# Patient Record
Sex: FEMALE
[De-identification: names, ages, dates, MRNs, and addresses within clinical notes are randomized per-mention and may not be internally consistent; named-entity substitution may affect disease eponyms.]

---

## 2018-05-09 ENCOUNTER — HOSPITAL ENCOUNTER (OUTPATIENT)
Dept: HOSPITAL 88 - MAMMO | Age: 48
End: 2018-05-09
Attending: INTERNAL MEDICINE
Payer: COMMERCIAL

## 2018-05-09 DIAGNOSIS — Z12.31: Primary | ICD-10-CM

## 2018-05-09 PROCEDURE — 77067 SCR MAMMO BI INCL CAD: CPT

## 2018-06-05 ENCOUNTER — HOSPITAL ENCOUNTER (OUTPATIENT)
Dept: HOSPITAL 88 - MAMMO | Age: 48
End: 2018-06-05
Attending: INTERNAL MEDICINE
Payer: COMMERCIAL

## 2018-06-05 DIAGNOSIS — R92.8: Primary | ICD-10-CM

## 2018-06-05 NOTE — DIAGNOSTIC IMAGING REPORT
#[id number] - MGDXLT

#UNILATERAL LEFT DIGITAL DIAGNOSTIC MAMMOGRAM WITH SPOT COMPRESSION AND MAGNIFICATION: 6/5/2018

Comparison is made to exams dated:  5/9/2018 mammogram - [facility] and 

5/30/2015 mammogram - [facility].  Current study contains 3 films.  

The tissue of the left breast is heterogeneously dense. This may lower the sensitivity of 

mammography.  

The area of new calcification in the left breast may be vascular in origin.  A one time interim 6 

month followup mammogram is recommended to demonstrate stability.  Scattered additional 

calcifications appear bening.   



IMPRESSION: PROBABLY BENIGN

A follow-up mammogram in 6 months is recommended to demonstrate stability. 

The patient has been or will be notified of the results.  





Miky Arora Jr., D.O.          

cw/:6/5/2018 15:03:13  



Imaging Technologist: Malaika LOZADA)(LAURIE), [facility]

letter sent: Followup Recommended  

Mammogram BI-RADS: 3 Probably benign

## 2019-03-05 ENCOUNTER — HOSPITAL ENCOUNTER (OUTPATIENT)
Dept: HOSPITAL 88 - MAMMO | Age: 49
End: 2019-03-05
Attending: INTERNAL MEDICINE
Payer: COMMERCIAL

## 2019-03-05 DIAGNOSIS — Z12.31: Primary | ICD-10-CM

## 2019-03-06 NOTE — DIAGNOSTIC IMAGING REPORT
#[id number] - MGDXLT

#UNILATERAL LEFT DIGITAL DIAGNOSTIC MAMMOGRAM WITH CAD WITH MAGNIFICATION SHORT-TERM FOLLOW-UP: 

3/5/2019

Comparison is made to exams dated:  6/5/2018 mammogram, 5/9/2018 mammogram - [facility] and 5/30/2015 mammogram - [facility].  Current study 

contains 6 films.  

The tissue of the left breast is heterogeneously dense. This may lower the sensitivity of 

mammography.  

Current study was also evaluated with a Computer Aided Detection (CAD) system.

There is little interval change in several areas of grouped calcifications.  Most of these do not 

have a suspicious shape.  

No significant masses, calcifications, or other findings are seen in the breast.  



IMPRESSION: PROBABLY BENIGN

A follow-up mammogram in 6 months is recommended to demonstrate stability. 

The patient has been notified of the results and the need for followup.  





Miky Arora Jr., D.O.          

cw/:3/5/2019 17:42:01  



Imaging Technologist: Malaika MARTINEZ(JOHNNY)(LAURIE), [facility]

letter sent: Followup Recommended  

Mammogram BI-RADS: 3 Probably benign

## 2019-03-28 ENCOUNTER — HOSPITAL ENCOUNTER (OUTPATIENT)
Dept: HOSPITAL 88 - CT | Age: 49
End: 2019-03-28
Attending: INTERNAL MEDICINE
Payer: COMMERCIAL

## 2019-03-28 DIAGNOSIS — R51: Primary | ICD-10-CM

## 2019-03-28 PROCEDURE — 70450 CT HEAD/BRAIN W/O DYE: CPT

## 2019-03-28 NOTE — DIAGNOSTIC IMAGING REPORT
Exam: Head CT without contrast

History:  Trauma sharp headache with pain and pressure.

Comparison studies:  None



Technique:

Axial images were obtained from the skull base to the vertex.

Coronal and sagittal images reconstructed from the axial data.  Dose

modulation, iterative reconstruction, and/or weight based adjustment of the

mA/kV was utilized to reduce the radiation dose to as low as reasonably

achievable.  



Radiation dose: 

Total DLP: 921 mGy*cm. 

Estimated effective dose: DLP x 0.015 

Intravenous contrast: None



Findings:



Scalp: No abnormalities.

Bones: No fractures, blastic or lytic lesions.



Brain sulci: Appropriate for age.

Ventricles: Normal in size and configuration. No hydrocephalus.

Extra-axial spaces: No masses, no fluid collection. 



Parenchyma: 

No abnormal densities. 

No masses, acute hemorrhage, acute or chronic vascular insults.



Sellar/suprasellar region: No abnormalities.

Craniocervical junction: Patent foramen magnum. No Chiari one malformation.



Middle ear and mastoid cavities: Clear.

Included paranasal sinuses: The sphenoid sinuses, partially imaged ethmoid air

cells, left frontal sinus and hypoplastic right frontal sinus are clear.





IMPRESSION:

 

No intracranial abnormalities.



Signed by: Dr. Brenton Wood M.D. on 3/28/2019 4:16 PM

## 2019-04-01 ENCOUNTER — HOSPITAL ENCOUNTER (EMERGENCY)
Dept: HOSPITAL 88 - ER | Age: 49
Discharge: HOME | End: 2019-04-01
Payer: COMMERCIAL

## 2019-04-01 VITALS — HEIGHT: 59 IN | WEIGHT: 192 LBS | BODY MASS INDEX: 38.71 KG/M2

## 2019-04-01 DIAGNOSIS — G44.89: Primary | ICD-10-CM

## 2019-04-01 DIAGNOSIS — K21.9: ICD-10-CM

## 2019-04-01 DIAGNOSIS — I10: ICD-10-CM

## 2019-04-01 DIAGNOSIS — G43.909: ICD-10-CM

## 2019-04-01 DIAGNOSIS — E11.9: ICD-10-CM

## 2019-04-01 LAB
BACTERIA URNS QL MICRO: (no result) /HPF
BILIRUB UR QL: NEGATIVE
CLARITY UR: CLEAR
COLOR UR: YELLOW
DEPRECATED RBC URNS MANUAL-ACNC: (no result) /HPF (ref 0–5)
EPI CELLS URNS QL MICRO: (no result) /LPF
KETONES UR QL STRIP.AUTO: NEGATIVE
LEUKOCYTE ESTERASE UR QL STRIP.AUTO: NEGATIVE
NITRITE UR QL STRIP.AUTO: NEGATIVE
PROT UR QL STRIP.AUTO: NEGATIVE
SP GR UR STRIP: 1 (ref 1.01–1.02)
UROBILINOGEN UR STRIP-MCNC: 0.2 MG/DL (ref 0.2–1)
WBC #/AREA URNS HPF: (no result) /HPF (ref 0–5)

## 2019-04-01 PROCEDURE — 81001 URINALYSIS AUTO W/SCOPE: CPT

## 2019-04-01 PROCEDURE — 99283 EMERGENCY DEPT VISIT LOW MDM: CPT

## 2019-04-01 NOTE — XMS REPORT
Patient Summary Document

                             Created on: 2019



RIMMA MOHR

External Reference #: [id number]

: 1970

Sex: Female



Demographics







                          Address                   [id number] [place] [address] 

[place]  [address]

 

                          Home Phone                [phone]

 

                          Preferred Language        Unknown

 

                          Marital Status            Unknown

 

                          [employer] Affiliation     Unknown

 

                          Race                      Unknown

 

                                        Additional Race(s)  

 

                          Ethnic Group              Unknown





Author







                          [facility]                    [facility]

 

                          [facility]

 

                          Address                   Unknown

 

                          Phone                     Unavailable







Care Team Providers







                    Care Team Member Name    Role                Phone

 

                    LAURIE VAUGHN      Unavailable         Unavailable







Problems

This patient has no known problems.



Allergies, Adverse Reactions, Alerts

This patient has no known allergies or adverse reactions.



Medications

This patient has no known medications.



Results







           Test Description    Test Time    Test Comments    Text Results    Atomic Results    Result

 Comments

 

                CT BRAIN WO     2019 16:12:00                                                               

                                           [place] [address]    
 Patient Name: RIMMA MOHR                                   MR #: [id number] 
                   : 1970                                   Age/Sex: 
48/F  Acct #: [id number]                              Req #: [id number]  Adm 
Physician:                                                      Ordered by: 
JESUS VAUGHN MD                            Report #: [id number]        
Location: CT                                      Room/Bed:                     
___________________________________________________________________________________________________
   Procedure: [id number] CT/CT BRAIN WO  Exam Date: 19                     
      Exam Time: 1530                                              REPORT 
STATUS: Signed    Exam: Head CT without contrast   History:  Trauma sharp 
headache with pain and pressure.   Comparison studies:  None      Technique:   
Axial images were obtained from the skull base to the vertex.   Coronal and 
sagittal images reconstructed from the axial data.  Dose   modulation, iterative
reconstruction, and/or weight based adjustment of the   mA/kV was utilized to 
reduce the radiation dose to as low as reasonably   achievable.        Radiation
dose:    Total DLP: 921 mGy*cm.    Estimated effective dose: DLP x 0.015    
Intravenous contrast: None      Findings:      Scalp: No abnormalities.   Bones:
No fractures, blastic or lytic lesions.      Brain sulci: Appropriate for age.  
Ventricles: Normal in size and configuration. No hydrocephalus.   Extra-axial 
spaces: No masses, no fluid collection.       Parenchyma:    No abnormal 
densities.    No masses, acute hemorrhage, acute or chronic vascular insults.   
  Sellar/suprasellar region: No abnormalities.   Craniocervical junction: Patent
foramen magnum. No Chiari one malformation.      Middle ear and mastoid ca
vities: Clear.   Included paranasal sinuses: The sphenoid sinuses, partially 
imaged ethmoid air   cells, left frontal sinus and hypoplastic right frontal 
sinus are clear.         IMPRESSION:       No intracranial abnormalities.      
Signed by: Dr. Gena Wood M.D. on 3/28/2019 4:16 PM        Dictated By: 
GENA WOOD MD  Electronically Signed By: GENA WOOD MD on 19  Transcribed By: CINDY on 19       COPY TO:   JESUS VAUGHN MD
                                         

 

                MAMMOGRAPHY DIGITAL DX UNI LT    2019 16:11:00                                             

                                                             [place] [address]      Patient Name: RIMMA MOHR                          
        MR #: [id number]                     : 1970                    
              Age/Sex: 48/F  Acct #: [id number]                              
Req #: [id number]  Adm Physician:                                               
      Ordered by: JESUS VAUGHN MD                            Report #: [id number]
[id number]        Location: [facility]                                   Room/Bed:         
           
___________________________________________________________________________________________________
   Procedure: [id number] MG/MAMMOGRAPHY DIGITAL DX UNI LT  Exam Date: 19   
                        Exam Time: 1600                                         
    REPORT STATUS: Signed       #[id number] - MGDXLT   #UNILATERAL LEFT DI
GITAL DIAGNOSTIC MAMMOGRAM WITH CAD WITH MAGNIFICATION SHORT-TERM FOLLOW-UP:    
3/5/2019   Comparison is made to exams dated:  2018 mammogram, 2018 
mammogram - [facility] and 2015 mammogram - 
[facility].  Current study    contains 6 films.     The
tissue of the left breast is heterogeneously dense. This may lower the 
sensitivity of    mammography.     Current study was also evaluated with a 
Computer Aided Detection (CAD) system.   There is little interval change in 
several areas of grouped calcifications.  Most of these do not    have a 
suspicious shape.     No significant masses, calcifications, or other findings 
are seen in the breast.        IMPRESSION: PROBABLY BENIGN   A follow-up 
mammogram in 6 months is recommended to demonstrate stability.    The patient 
has been notified of the results and the need for followup.           Cruz Arora Jr., D.O.             cw/:3/5/2019 17:42:01        Imaging Technologist: 
Malaika MARTINEZ (R)(LAURIE), [facility]   letter sent: 
Followup Recommended     Mammogram BI-RADS: 3 Probably benign     Dictated By: 
CRUZ ARORA DO  Electronically Signed By: CRUZ ARORA DO on 19  
Transcribed By: VALENTIN on 19       COPY TO:   JESUS VAUGHN MD       
                                         

 

                MAMMOGRAPHY DIGITAL DX UNI LT                                        [place] [address]      Patient Name: 
RIMMA MOHR   MR #: [id number]    : 1970 Age/Sex: 47/F  Acct #: 
[id number] Req #: [id number]  Adm Physician:     Ordered by: JESUS VAGUHN MD
 Report #: [id number]   Location: MAMMO  Room/Bed:     
_____________________________________________________________________________
______________________    Procedure: [id number] MG/MAMMOGRAPHY DIGITAL DX UNI LT 
Exam Date: 18                            Exam Time: 1313       REPORT 
STATUS: Signed       #[id number] - MGDXLT   #UNILATERAL LEFT DIGITAL 
DIAGNOSTIC MAMMOGRAM WITH SPOT COMPRESSION AND MAGNIFICATION: 2018   
Comparison is made to exams dated:  2018 mammogram - [facility] and    2015 mammogram - [facility].
 Current study contains 3 films.     The tissue of the left breast is 
heterogeneously dense. This may lower the sensitivity of    mammography.     The
area of new calcification in the left breast may be vascular in origin.  A one 
time interim 6    month followup mammogram is recommended to demonstrate 
stability.  Scattered additional    calcifications appear bening.         
IMPRESSION: PROBABLY BENIGN   A follow-up mammogram in 6 months is recommended 
to demonstrate stability.    The patient has been or will be notified of the 
results.           Cruz Arora Jr., D.O.             cw/:2018 15:03:13     
  Imaging Technologist: Malaika LOZADA)(LAURIE), [facility]   letter sent: Followup Recommended     Mammogram BI-RADS: 3 Probably 
benign     Dictated By: CRUZ ARORA DO  Electronically Signed By: CRUZ ARORA DO on 18 1503  Transcribed By: VALENTIN on 18 1503       COPY 
TO:   JESUS VAUGHN MD                   

 

                MAMMOGRAPHY DIGITAL SCR BILAT                                        [place] [address]      Patient Name: 
RIMMA MOHR   MR #: [id number]    : 1970 Age/Sex: 47/F  Acct #: 
[id number] Req #: [id number]  Adm Physician:     Ordered by: JESUS VAUGHN MD
 Report #: [id number]   Location: MAMMO  Room/Bed:     
_____________________________________________________________________________
______________________    Procedure: [id number] MG/MAMMOGRAPHY DIGITAL SCR BILAT 
Exam Date: 18                            Exam Time: 922       REPORT 
STATUS: Signed       #[id number] - MGSCRBIL   #BILATERAL DIGITAL SCREENING 
MAMMOGRAM WITH CAD: 2018   CLINICAL: Routine screening.        Comparison is
made to exams dated:  2015 mammogram, 3/2/2013 mammogram and 2011 
mammogram    - [facility].  Current study contains 5 
films.     The tissue of both breasts is heterogeneously dense. This may lower 
the sensitivity of mammography.        Current study was also evaluated with a 
Computer Aided Detection (CAD) system.     There is an amorphous calcification 
in the left breast central to the nipple posterior depth that is    new compared
to the most recent 2015 mammogram.  Scattered benign calcification in both 
breasts are    again noted.   No other significant masses, calcifications, or 
other findings are seen in either breast.        IMPRESSION: INCOMPLETE: NEEDS 
ADDITIONAL IMAGING EVALUATION   The amorphous calcification in the left breast 
is indeterminate.  Spot magnification views are    recommended.        The 
patient will be contacted by the Mammography Department to schedule this 
appointment.           Cruz Arora Jr., D.O.             cw/:2018 12:19:18
       Imaging Technologist: Malaika MARTINEZ(JOHNNY)(LAURIE), [facility]   letter sent: Additional Imaging Needed     Mammogram BI-RADS: 0
Indeterminate     Dictated By: CRUZ ARORA DO  Electronically Signed By: CRUZ ARORA DO on 18 1219  Transcribed By: VALENTIN on 18 1219       COPY 
TO:   JESUS VAUGHN MD                   

 

                US ABDOMEN COMPLETE                                        [place] [address]      Patient Name: RIMMA MOHR   
MR #: [id number]    : 1970 Age/Sex: 46/F  Acct #: [id number] Req #: 
[id number]  Adm Physician:     Ordered by: JESUS VAUGHN MD  Report #: [id number]
[id number]   Location: US  Room/Bed:     
________________________________________________________________________________
___________________    Procedure: [id number] US/US ABDOMEN COMPLETE  Exam Date: 
10/04/17                            Exam Time: 0830       REPORT STATUS: Signed 
  PROCEDURE:   ABDOMINAL ULTRASOUND   COMPARISON:   3/22/2016.   INDICATIONS:   
Abdominal Pain       FINDINGS:          Liver: 18.3 cm in length in the right 
midclavicular line. Increased    hepatic parenchymal echogenicity. No focal 
mass.    Main portal vein: 1.3 cm in caliber. Hepatopedal flow.        
Gallbladder: 6 mm polypoid lesion projecting from the anterior wall is    
unchanged compared to 3/22/2016. No shadowing calculus, wall    thickening, or 
pericholecystic fluid.   Common Bile Duct: 0.4 cm in caliber. No echogenic 
filling defect.    Sonographic Mancilla's sign: Reported as negative.       Right 
kidney: 11.7 cm in length. No solid or cystic mass, echogenic    calculi, or 
hydronephrosis. Normal renal cortical echogenicity.   Left kidney: 12.3 cm in 
length. No solid or cystic mass, echogenic    calculi, or hydronephrosis. Normal
renal cortical echogenicity.        Spleen: 11.8 cm in length. Uniform 
parenchymal echotexture.   Pancreas: The visualized portions of the pancreas are
normal.       Inferior vena cava: Patent.   Aorta: Non-aneurysmal.   Ascites: 
None.       CONCLUSION:          Hepatomegaly with hepatic steatosis.       6 mm
gallbladder polyp is unchanged relative to 3/22/2016. Annual    followup right 
upper quadrant ultrasound is suggested to assess for    continued stability.    
              Dictated by:  Gena Ambrosio M.D. on 10/04/2017 at 9:25        
Electronically approved by:  Gena Ambrosio M.D. on 10/04/2017 at 9:25             
  Dictated By: GENA AMBROSIO MD  Electronically Signed By: GENA AMBROSIO MD on 
10/04/17 0925  Transcribed By: BRAYDEN on 10/04/17 0925       COPY TO:   
JESUS VAUGHN MD

## 2019-10-14 ENCOUNTER — HOSPITAL ENCOUNTER (OUTPATIENT)
Dept: HOSPITAL 88 - MAMMO | Age: 49
End: 2019-10-14
Attending: INTERNAL MEDICINE
Payer: COMMERCIAL

## 2019-10-14 DIAGNOSIS — N64.59: Primary | ICD-10-CM

## 2019-10-14 PROCEDURE — 77066 DX MAMMO INCL CAD BI: CPT

## 2019-10-15 NOTE — DIAGNOSTIC IMAGING REPORT
#[id number] - MGDXBIL

#BILATERAL DIGITAL DIAGNOSTIC MAMMOGRAM WITH CAD: 10/14/2019

Comparison is made to exams dated:  3/5/2019 mammogram, 6/5/2018 mammogram and 5/9/2018 mammogram - 

[facility].  Current study contains 8 films.  

There are scattered fibroglandular elements in both breasts.  

Current study was also evaluated with a Computer Aided Detection (CAD) system.  

Benign appearing calcifications are noted bilaterally.  

There are stable grouped fine calcifications in the left breast at 6 o'clock posterior depth.  

No other significant masses, calcifications, or other findings are seen in either breast.  



IMPRESSION: PROBABLY BENIGN

The stable grouped fine calcifications in the left breast are probably benign.  A follow-up in 6 

months is recommended.  

A follow-up mammogram in 6 months is recommended to demonstrate stability. 

The patient has been or will be notified of the results.  





CINDY MONTEZ M.D.          

ct/penrad:10/14/2019 18:58:38  



Imaging Technologist: Malaika MARTINEZ(JOHNNY)(LAURIE), [facility]

letter sent: Followup Recommended  

Mammogram BI-RADS: 3 Probably benign

## 2019-12-03 ENCOUNTER — HOSPITAL ENCOUNTER (OUTPATIENT)
Dept: HOSPITAL 88 - MRI | Age: 49
End: 2019-12-03
Attending: INTERNAL MEDICINE
Payer: COMMERCIAL

## 2019-12-03 DIAGNOSIS — S83.281A: ICD-10-CM

## 2019-12-03 DIAGNOSIS — M19.072: Primary | ICD-10-CM

## 2019-12-03 NOTE — DIAGNOSTIC IMAGING REPORT
EXAM:  ANKLE 3+ VIEWS LEFT



DATE: 12/3/2019 8:25 AM  



INDICATION: Osteoarthritis left foot  



COMPARISON: None



FINDINGS:

There is no evidence for acute fracture or dislocation. The ankle mortise is

maintained. No focal lytic or blastic abnormality is identified. Posterior

calcaneal spurring noted. The surrounding soft tissues are unremarkable without

evidence for radiopaque foreign body.





IMPRESSION:



No acute radiographic abnormality identified within the left ankle.



Signed by: Dr. Lopez Altamirano MD on 12/3/2019 10:07 AM

## 2019-12-03 NOTE — DIAGNOSTIC IMAGING REPORT
EXAM:  FOOT LEFT COMPLETE



DATE: 12/3/2019 8:25 AM  



INDICATION: Pain  



COMPARISON: None



FINDINGS:

There is no evidence for acute fracture or dislocation. Bony mineralization is

within normal limits. No focal lytic or blastic abnormality is identified.

Posterior calcaneal spurring noted. The soft tissues are unremarkable without

evidence for radiopaque foreign body.





IMPRESSION:



No acute radiographic abnormality identified within the left foot.



Signed by: Dr. Lopez Altamirano MD on 12/3/2019 10:13 AM

## 2019-12-03 NOTE — DIAGNOSTIC IMAGING REPORT
TECHNIQUE:

Magnetic resonance imaging of the RIGHT KNEE was performed WITHOUT injected

contrast. 



HISTORY:  Right knee pain

COMPARISON:  None available.



FINDINGS: 

LIGAMENTS AND TENDONS:

     ACL:  Intact

     PCL:  Intact

     Collateral ligaments:  Intact

     Iliotibial band:  Unremarkable

     Popliteal tendon:  Intact

     Extensor mechanism:  Intact



JOINT:

     Menisci: 

          Medial:  Complex tearing of the posterior horn with extrusion

          Lateral:  Intact



     Articular Cartilage:

          Medial Compartment:  Diffuse partial-thickness cartilage loss with

areas of full-thickness fissuring.

          Lateral Compartment:  Diffuse partial-thickness cartilage loss

          Patellofemoral Compartment:  High-grade focal fissure and

delamination at the patellar apex.



     Joint Fluid: Small joint effusion.



BONE:  

No focal or infiltrative bone marrow replacing abnormality.  

No acute fracture.



SOFT TISSUES:

Otherwise, unremarkable.





IMPRESSION: 



Medial meniscus complex tearing to the posterior horn with extrusion results in

diffuse medial compartment cartilage loss.



Signed by: Dr. Andrew Palisch, M.D. on 12/3/2019 9:41 AM

## 2019-12-23 ENCOUNTER — HOSPITAL ENCOUNTER (OUTPATIENT)
Dept: HOSPITAL 88 - US | Age: 49
End: 2019-12-23
Attending: INTERNAL MEDICINE
Payer: COMMERCIAL

## 2019-12-23 DIAGNOSIS — R74.8: Primary | ICD-10-CM

## 2019-12-23 PROCEDURE — 76700 US EXAM ABDOM COMPLETE: CPT

## 2019-12-23 NOTE — DIAGNOSTIC IMAGING REPORT
EXAM: US ABDOMEN COMPLETE

DATE: 12/23/2019 8:37 AM  

INDICATION:   Abnormal liver enzymes

COMPARISON: None 

TECHNIQUE: Transverse and longitudinal gray scale and color doppler sonographic

images of the upper abdomen were obtained. 



FINDINGS: 

    

LIVER

18.7 cm in the right midclavicular line.

Increased echogenicity of the liver with normal contour, no masses.



SPLEEN

13.3 cm in maximum diameter.

Normal echogenicity, no masses.



GALLBLADDER

No gallbladder wall thickening, distension, stone, or pericholecystic fluid.

Negative reported sonographic Mancilla's sign. The gallbladder wall measures 3mm



BILE DUCTS

No intra nor extra-hepatic biliary dilation.

Common bile duct measures 3mm



PANCREAS: Visualized portions are normal.



RIGHT KIDNEY: 11.4 cm

Echogenicity: Normal

Collecting System:  No hydronephrosis

Stones:  None

Cyst/Mass: None



LEFT KIDNEY: 12.4 cm

Echogenicity: Normal

Collecting System:  No hydronephrosis

Stones:  None

Cyst/Mass: None



VESSELS:

Aorta: Visualized portions are within normal size limits

Inferior Vena Cava: Visualized portions are normal

Main Portal Vein: 1.2 cm, normal size with hepatopetal flow.



FREE FLUID: None



IMPRESSION:

Hepatomegaly and mild hepatic steatosis.



No sonographic evidence of cholelithiasis or cholecystitis.





Signed by: Aliza Ellington MD on 12/23/2019 10:17 AM

## 2020-09-17 ENCOUNTER — HOSPITAL ENCOUNTER (OUTPATIENT)
Dept: HOSPITAL 88 - MAMMO | Age: 50
End: 2020-09-17
Attending: INTERNAL MEDICINE
Payer: COMMERCIAL

## 2020-09-17 DIAGNOSIS — R92.1: Primary | ICD-10-CM

## 2020-09-17 PROCEDURE — 77066 DX MAMMO INCL CAD BI: CPT

## 2020-09-18 NOTE — DIAGNOSTIC IMAGING REPORT
#[id number] - MGDXBIL

#BILATERAL DIGITAL DIAGNOSTIC MAMMOGRAM WITH CAD: 9/17/2020

Comparison is made to exams dated:  10/14/2019 mammogram, 3/5/2019 mammogram, 6/5/2018 mammogram and

 5/9/2018 mammogram - [facility].  

There are scattered fibroglandular elements in both breasts.  

Current study was also evaluated with a Computer Aided Detection (CAD) system.  

There is a benign lymph node in both breasts.  There also are benign scattered calcifications in 

both breasts.  

No significant masses, calcifications, or other findings are seen in either breast.  

There has been no significant interval change.



IMPRESSION: BENIGN

There is no mammographic evidence of malignancy.  Return to annual mammogram screening schedule is 

recommended. 

The patient will be notified by letter of the results.  





ZAKIYA ghosh/tresa:9/18/2020 09:46:10  



Imaging Technologist: Malaika MARTINEZ(JOHNNY)(M), [facility]

letter sent: Compared to Prior B9  

Mammogram BI-RADS: 2 Benign

## 2022-02-08 ENCOUNTER — HOSPITAL ENCOUNTER (OUTPATIENT)
Dept: HOSPITAL 88 - MAMMO | Age: 52
End: 2022-02-08
Attending: INTERNAL MEDICINE
Payer: COMMERCIAL

## 2022-02-08 DIAGNOSIS — Z12.31: Primary | ICD-10-CM

## 2022-02-08 PROCEDURE — 77067 SCR MAMMO BI INCL CAD: CPT
